# Patient Record
Sex: MALE | Race: WHITE
[De-identification: names, ages, dates, MRNs, and addresses within clinical notes are randomized per-mention and may not be internally consistent; named-entity substitution may affect disease eponyms.]

---

## 2019-12-13 ENCOUNTER — HOSPITAL ENCOUNTER (OUTPATIENT)
Dept: HOSPITAL 25 - OR | Age: 44
Discharge: HOME | End: 2019-12-13
Attending: ORTHOPAEDIC SURGERY
Payer: COMMERCIAL

## 2019-12-13 VITALS — SYSTOLIC BLOOD PRESSURE: 132 MMHG | DIASTOLIC BLOOD PRESSURE: 84 MMHG

## 2019-12-13 DIAGNOSIS — K21.9: ICD-10-CM

## 2019-12-13 DIAGNOSIS — G89.18: ICD-10-CM

## 2019-12-13 DIAGNOSIS — M75.51: ICD-10-CM

## 2019-12-13 DIAGNOSIS — S43.431A: ICD-10-CM
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
## 2019-12-13 DIAGNOSIS — M75.41: ICD-10-CM
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
## 2019-12-13 DIAGNOSIS — S46.011A: Primary | ICD-10-CM

## 2019-12-13 DIAGNOSIS — M19.011: ICD-10-CM

## 2019-12-13 DIAGNOSIS — I10: ICD-10-CM

## 2019-12-13 PROCEDURE — C1713 ANCHOR/SCREW BN/BN,TIS/BN: HCPCS

## 2019-12-14 NOTE — OP
OPERATIVE REPORT:

 

DATE OF OPERATION:  12/13/19

 

DATE OF BIRTH:  [birth year]

 

SURGEON:  Varghese Garcia MD

 

ASSISTANT:  MARIA GUADALUPE Marc

 

A physician assistant was required for the length of the procedure for assistance with patient positi
oning, retraction, instrumentation, and closure.

 

ANESTHESIOLOGIST:  Dr. Rossi White.

 

ANESTHESIA:  General anesthesia, regional interscalene block anesthesia.

 

PRE-OP DIAGNOSES:

1.  Severe right shoulder acromioclavicular joint osteoarthritis.

2.  Right shoulder subacromial impingement and bursitis.

3.  Right shoulder rotator cuff supraspinatus, tendinopathy, possible tear.

4.  Right shoulder possible biceps or superior labral tear.

 

POST-OP DIAGNOSES:

1.  Right shoulder severe acromioclavicular joint osteoarthritis.

2.  Right shoulder subacromial impingement and bursitis.

3.  Right shoulder rotator cuff tendinitis and partial-thickness rotator cuff tendon tear, supraspina
tus.

4.  Right shoulder superior labrum tear.

 

OPERATIVE PROCEDURE:

1.  Right shoulder arthroscopic rotator cuff tendon repair with Regeneten biologic patch.

2.  Right shoulder arthroscopic subacromial decompression.

3.  Right shoulder arthroscopic distal clavicle resection.

4.  Right shoulder arthroscopic debridement superior labrum, posterior labrum and release of biceps l
mario head tendon.

 

ANTIBIOTICS:  Ancef 2 g IV.

 

IV FLUIDS:  See Anesthesia note.

 

SKIN-TO-SKIN TIME:  53 minutes.

 

SPECIMENS:  None.

 

IMPLANTS:  [employer] Regeneten biologic patch, size large.

 

ESTIMATED BLOOD LOSS:  Minimal.

 

COMPLICATIONS:  None.

 

INDICATIONS FOR PROCEDURE:  The patient is a 44-year-old man, the builder of furniture and plastics, 
who I have followed for right shoulder pain, made worse with playing racquetball in June of 2019, 6 m
[facility] preoperative.  The patient continued to have pain about the superior shoulder and elsewhere matias
ecially when lifting his [id number] child.  The patient failed to respond to a full spectrum of non
operative management and opted for surgery.  His MRI showed severe AC joint osteoarthritis and subacr
omial impingement along with supraspinatus tendinitis and partial-thickness tearing of the rotator cu
ff, intrasubstance of supraspinatus and articular sided of infraspinatus.

 

Discussed risks and potential complications of surgery with the patient and he opted to proceed forwa
rd.

 

Discussed possible treatment of biceps and superior labrum, which would be by biceps release or tenot
candy versus an open proximal biceps tenodesis.  Discussed the pros and cons of each and the patient pr
eferred a biceps release if any treatment was required.

 

DESCRIPTION OF PROCEDURE:  In preoperative holding, the patient signed a written consent.  Operative 
extremity was marked in preoperative holding.  The patient was taken back to the operating room and p
laced supine on the operating room table. Sedated and intubated.

 

The patient was converted to the lateral decubitus position.  The right shoulder was placed up.  Axil
ruben roll placed.  All bony prominences padded.  Longitudinal traction with 15 pounds with the approp
riate amount of forward flexion and abduction of the shoulder.  Right shoulder was prepped and draped
.  Formal surgical time-out performed.

 

A spinal needle was entered into the glenohumeral joint from posterior and 30 cc of normal saline was
 introduced.  I then established a posterior glenohumeral joint portal using standard technique.

 

I started diagnostic arthroscopy.  No significant injury noted to articular cartilage.  No loose bodi
es.  There was clearly significant fraying about the superior labrum at the biceps anchor.  There was
 some minimal fraying about the posterior labrum.  There was some clear undersurface tearing of the s
upraspinatus and infraspinatus.

 

I established an anterior glenohumeral joint portal under direct visualization. Smoothed out superior
 labrum, reamed with shaver and then probed it.  I found that there was an unstable tear and so decid
ed to treat that with biceps release.  I brought arthroscopic scissors in and released the biceps meeta
r its origin.  It retracted.

 

The patient had some minimal fraying about the posterior labrum, which I debrided with the arthroscop
ic shaver, smoothing out.

 

Moved to rotator cuff.  Clearly, some very minimal undersurface tearing.  I smoothed that out with an
 arthroscopic shaver and noted more than 1 or 2 mm of undersurface footprint visualizable.  Next to t
he weakest component of the tendon undersurface, I placed a spinal needle to seferino it when I moved sub
acromial.

 

Removed fluid and instruments from the glenohumeral joint and entered the subacromial space from ante
rior and posterior.  Established lateral subacromial portal.  Debrided bursitic tissue with an arthro
scopic shaver placed lateral. Identified location of spinal needle.  Probed the rotator cuff with swi
tching stick and arthroscopic probe and visualized it.  No bursal sided tearing to the rotator cuff.

 

I made a posterolateral portal for improved visualization.  I performed a subacromial decompression u
sing an arthroscopic teena, flattening out the undersurface of the anterior aspect of the acromion.

 

At this point, I decided to do a rotator cuff biologic patch.  This was because of the minimal unders
urface tearing on the articular side of the rotator cuff.  Given the patient's size and the involveme
nt of both the supraspinatus and the infraspinatus, I chose to use a size large patch.

 

I brought the patch in from lateral and fixed it in place with PLLA staples.  This was smooth.  Patch
 was stable to movement and probing.

 

I moved to the AC joint.  Debrided synovitic tissue using a cautery device.  I did a very aggressive 
removal of the distal clavicle at least 8 mm until there was a nice gap between acromion and clavicle
.  The pictures taken were especially nice. I again confirmed that the patch was firmly fixed.  I rem
gregg instruments and fluid from subacromial space.

 

After instruments and fluid were removed from subacromial space, I closed the skin incisions with fig
ure-of-eight and 12 stitches using nylon 3-0 suture.  Dressing consisted of Xeroform, 4x4s, ABDs, and
 foam tape.  Sling with abduction pillow applied.  The patient was awakened, extubated, and transferr
ed to the PACU.

 

DISPOSITION:  The patient was discharged home when medically stable.  The patient will start physical
 therapy immediately [facility].  He will be in a sling for 2
 to 3 weeks postoperatively.  He will follow up with me in 10 to 14 days postoperatively.  He will ta
adam Percocet as needed for pain control.

 

 [id number]/CPS #: [id number]